# Patient Record
(demographics unavailable — no encounter records)

---

## 2018-11-02 NOTE — 24HR
[place] [address]
Phone:  [phone]                     HOLTER MONITOR REPORT         
_______________________________________________________________________________
 
Name:         VINAY PRATT               Room:                     REG CLI
M.R.#:    [id number]     Account #:     [id number]  
Admission:    10/31/18    Attend Phys:   Shwetha Quiroz MD  
Discharge:                Date of Birth: 08/15/87  
Date of Service: 18  Report #:      [id number]
        [id number]
_______________________________________________________________________________
THIS REPORT FOR:  //name//                      
 
                          [facility]
                                       
Test Date:    2018               Test Time:    09:06:15
Pat Name:     VINAY PRATT          Department:   
Patient ID:   [id number]            Room:          
Gender:       F                        Technician:   
:          1987               Requested By: Shwetha Quiroz
Order Number: [id number] Reading MD:   Tien Donohue
                           Interpretive Statements
1.  sinus rhythm with tachycardia and bradycardia
2.  rare pac
3.  symptoms did not correlate with an arrhythmia
 
Electronically Signed On 2018 13:32:01 CDT by Tien Donohue
[email or web address]
 
 
 
 
 
 
 
 
 
 
 
 
 
 
 
 
 
 
 
 
 
 
 
 
 
 
 
  <ELECTRONICALLY SIGNED>
                                           By: Tien Donohue MD, [facility]      
  18     1332
D: 18   _____________________________________
T: 18   Tien Donohue MD, FACC        /EPI